# Patient Record
Sex: OTHER/UNKNOWN | URBAN - METROPOLITAN AREA
[De-identification: names, ages, dates, MRNs, and addresses within clinical notes are randomized per-mention and may not be internally consistent; named-entity substitution may affect disease eponyms.]

---

## 2023-12-20 ENCOUNTER — APPOINTMENT (OUTPATIENT)
Dept: URBAN - METROPOLITAN AREA CLINIC 258 | Age: 48
Setting detail: DERMATOLOGY
End: 2023-12-20

## 2023-12-20 DIAGNOSIS — Z41.9 ENCOUNTER FOR PROCEDURE FOR PURPOSES OTHER THAN REMEDYING HEALTH STATE, UNSPECIFIED: ICD-10-CM

## 2023-12-20 PROCEDURE — OTHER JUVEDERM ULTRA INJECTION: OTHER

## 2023-12-20 PROCEDURE — OTHER ADDITIONAL NOTES: OTHER

## 2023-12-20 ASSESSMENT — LOCATION DETAILED DESCRIPTION DERM: LOCATION DETAILED: LEFT CHIN

## 2023-12-20 ASSESSMENT — LOCATION SIMPLE DESCRIPTION DERM: LOCATION SIMPLE: CHIN

## 2023-12-20 ASSESSMENT — LOCATION ZONE DERM: LOCATION ZONE: FACE

## 2023-12-20 NOTE — PROCEDURE: JUVEDERM ULTRA INJECTION
Decollete Filler Volume In Cc: 0
Filler: Juvederm Ultra
Detail Level: Simple
Procedural Text: The filler was administered to the treatment areas noted above.
Use Map Statement For Sites (Optional): No
Additional Anesthesia Volume In Cc: 6
Post-Care Instructions: Patient instructed to apply ice to reduce swelling.
Consent: Written consent obtained. Risks include but not limited to bruising, beading, irregular texture, ulceration, infection, allergic reaction, scar formation, incomplete augmentation, temporary nature, procedural pain.
Anesthesia Type: 1% lidocaine with epinephrine
Show Inventory Tab: Hide
Number Of Syringes (Required For Inventory): 1
Additional Area 1 Location: chin
Lot #: [id number]
Map Statement: See Attached Map for Complete Details
Price (Use Numbers Only, No Special Characters Or $): 102
Topical Anesthesia?: 23% lidocaine, 7% tetracaine
Additional Area 1 Volume In Cc: 0.5

## 2023-12-20 NOTE — PROCEDURE: ADDITIONAL NOTES
Additional Notes: Half a syringe injected. Total volume injected= 0.55mL \\nConsent obtained. Payment collected at end of office visit. \\nPatient given ice pack to take home.
Detail Level: Simple
Render Risk Assessment In Note?: no

## 2023-12-20 NOTE — HPI: COSMETIC (FILLERS)
previous_has_had_fillers
Additional History: Patient reports last having filler about 2 to 3 years ago in chin area. She reports that it lasted a long time. She reports that Dr. Acosta injected the filler when clinic was [facility]. \\nPatient allergies and medications reviewed.